# Patient Record
Sex: MALE | Race: WHITE | URBAN - METROPOLITAN AREA
[De-identification: names, ages, dates, MRNs, and addresses within clinical notes are randomized per-mention and may not be internally consistent; named-entity substitution may affect disease eponyms.]

---

## 2021-12-06 ENCOUNTER — OFFICE VISIT (OUTPATIENT)
Dept: URBAN - METROPOLITAN AREA CLINIC 12 | Facility: CLINIC | Age: 66
End: 2021-12-06
Payer: MEDICARE

## 2021-12-06 DIAGNOSIS — E66.3 OVERWEIGHT (BMI 25.0-29.9): ICD-10-CM

## 2021-12-06 DIAGNOSIS — Z79.01 ANTICOAGULATED BY ANTICOAGULATION TREATMENT: ICD-10-CM

## 2021-12-06 DIAGNOSIS — K64.8 INTERNAL HEMORRHOIDS: ICD-10-CM

## 2021-12-06 DIAGNOSIS — Z86.718 HISTORY OF DVT (DEEP VEIN THROMBOSIS): ICD-10-CM

## 2021-12-06 DIAGNOSIS — Z86.010 HISTORY OF COLON POLYPS: ICD-10-CM

## 2021-12-06 DIAGNOSIS — K57.30 DIVERTICULA OF COLON: ICD-10-CM

## 2021-12-06 DIAGNOSIS — K64.4 EXTERNAL HEMORRHOIDS: ICD-10-CM

## 2021-12-06 PROBLEM — 161508001: Status: ACTIVE | Noted: 2021-12-06

## 2021-12-06 PROBLEM — 90458007: Status: ACTIVE | Noted: 2021-12-06

## 2021-12-06 PROBLEM — 733657002: Status: ACTIVE | Noted: 2021-12-06

## 2021-12-06 PROBLEM — 309298003: Status: ACTIVE | Noted: 2021-12-06

## 2021-12-06 PROBLEM — 428283002: Status: ACTIVE | Noted: 2021-12-06

## 2021-12-06 PROBLEM — 23913003: Status: ACTIVE | Noted: 2021-12-06

## 2021-12-06 PROCEDURE — 99202 OFFICE O/P NEW SF 15 MIN: CPT | Performed by: INTERNAL MEDICINE

## 2021-12-06 RX ORDER — APIXABAN 2.5 MG/1
TABLET, FILM COATED ORAL
Qty: 0 | Refills: 0 | Status: ACTIVE | COMMUNITY
Start: 1900-01-01

## 2021-12-06 NOTE — HPI-TODAY'S VISIT:
This is a 66-year-old gentleman presents for GI evaluation in the clinic. He sees Dr. Nithin Harvey for primary care. Patient states that he has had colon polyps in the past. Patient states that he has normal daily bowel habits. There has been no constipation or diarrhea. Has been no bowel pain. Its been no unintentional weight loss. There have been no anemia. There's been no blood noted in his stool. He is feeling well except for arthritis. 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 He has been doing well otherwise. His last colonoscopy was negative and 2018. He was known to have diverticulosis and hemorrhoids but otherwise was okay. There has been no rectal pain. Its been no chest pain or shortness of breath or fatigue.

## 2023-07-31 ENCOUNTER — WEB ENCOUNTER (OUTPATIENT)
Dept: URBAN - METROPOLITAN AREA CLINIC 12 | Facility: CLINIC | Age: 68
End: 2023-07-31

## 2023-08-07 ENCOUNTER — OFFICE VISIT (OUTPATIENT)
Dept: URBAN - METROPOLITAN AREA CLINIC 12 | Facility: CLINIC | Age: 68
End: 2023-08-07
Payer: MEDICARE

## 2023-08-07 ENCOUNTER — DASHBOARD ENCOUNTERS (OUTPATIENT)
Age: 68
End: 2023-08-07

## 2023-08-07 VITALS
TEMPERATURE: 96.8 F | HEART RATE: 61 BPM | WEIGHT: 193 LBS | DIASTOLIC BLOOD PRESSURE: 80 MMHG | HEIGHT: 72 IN | BODY MASS INDEX: 26.14 KG/M2 | SYSTOLIC BLOOD PRESSURE: 132 MMHG

## 2023-08-07 DIAGNOSIS — Z86.010 HISTORY OF COLON POLYPS: ICD-10-CM

## 2023-08-07 DIAGNOSIS — K64.4 EXTERNAL HEMORRHOIDS: ICD-10-CM

## 2023-08-07 DIAGNOSIS — K64.8 INTERNAL HEMORRHOIDS: ICD-10-CM

## 2023-08-07 DIAGNOSIS — K57.30 DIVERTICULA OF COLON: ICD-10-CM

## 2023-08-07 PROBLEM — 162863004: Status: ACTIVE | Noted: 2023-08-07

## 2023-08-07 PROCEDURE — 99213 OFFICE O/P EST LOW 20 MIN: CPT | Performed by: INTERNAL MEDICINE

## 2023-08-07 RX ORDER — APIXABAN 2.5 MG/1
TABLET, FILM COATED ORAL
Qty: 0 | Refills: 0 | Status: ACTIVE | COMMUNITY
Start: 1900-01-01

## 2023-08-07 RX ORDER — POLYETHYLENE GLYCOL 3350, SODIUM SULFATE, SODIUM CHLORIDE, POTASSIUM CHLORIDE, ASCORBIC ACID, SODIUM ASCORBATE 140-9-5.2G
AS DIRECTED KIT ORAL
Qty: 1 BOX | Refills: 0 | OUTPATIENT
Start: 2023-08-07 | End: 2023-08-09

## 2023-08-07 NOTE — HPI-TODAY'S VISIT:
68M presents for evaluation for colonoscopy.  He would like to have a colonoscopy. He is feeling well. No change in BMs. No abd pain. No GI bleeding. No recent problems with hemorrhoids. No chest pain or shortness of breath or palpiations. Eats healthy. Swims and rides bike regularly with neighbor. Trying to lose a few pounds. Seems PCP regularly for RHCM.  On Eliquis for DVT after THR on right in 2010 with DOAC prescribed and followed by heme.   Colonoscopy in 2018 with EH/IH, left tics, and negative for significatn polyps.  Had colonoscopy in past with polyps apparently prior to that.

## 2023-09-15 ENCOUNTER — TELEPHONE ENCOUNTER (OUTPATIENT)
Dept: URBAN - METROPOLITAN AREA CLINIC 6 | Facility: CLINIC | Age: 68
End: 2023-09-15

## 2023-09-18 ENCOUNTER — TELEPHONE ENCOUNTER (OUTPATIENT)
Dept: URBAN - METROPOLITAN AREA CLINIC 12 | Facility: CLINIC | Age: 68
End: 2023-09-18

## 2023-09-20 ENCOUNTER — WEB ENCOUNTER (OUTPATIENT)
Dept: URBAN - METROPOLITAN AREA CLINIC 111 | Facility: CLINIC | Age: 68
End: 2023-09-20

## 2023-10-30 ENCOUNTER — OFFICE VISIT (OUTPATIENT)
Dept: URBAN - METROPOLITAN AREA LAB 3 | Facility: LAB | Age: 68
End: 2023-10-30

## 2023-11-20 ENCOUNTER — OFFICE VISIT (OUTPATIENT)
Dept: URBAN - METROPOLITAN AREA LAB 3 | Facility: LAB | Age: 68
End: 2023-11-20
Payer: MEDICARE

## 2023-11-20 DIAGNOSIS — Z86.010 ADENOMAS PERSONAL HISTORY OF COLONIC POLYPS: ICD-10-CM

## 2023-11-20 DIAGNOSIS — Z09 CARDIOLOGY FOLLOW-UP ENCOUNTER: ICD-10-CM

## 2023-11-20 PROCEDURE — G0105 COLORECTAL SCRN; HI RISK IND: HCPCS | Performed by: INTERNAL MEDICINE

## 2024-02-28 ENCOUNTER — APPOINTMENT (RX ONLY)
Dept: URBAN - METROPOLITAN AREA CLINIC 28 | Facility: CLINIC | Age: 69
Setting detail: DERMATOLOGY
End: 2024-02-28

## 2024-02-28 DIAGNOSIS — Z85.828 PERSONAL HISTORY OF OTHER MALIGNANT NEOPLASM OF SKIN: ICD-10-CM

## 2024-02-28 DIAGNOSIS — L20.89 OTHER ATOPIC DERMATITIS: ICD-10-CM | Status: INADEQUATELY CONTROLLED

## 2024-02-28 DIAGNOSIS — Z71.89 OTHER SPECIFIED COUNSELING: ICD-10-CM

## 2024-02-28 DIAGNOSIS — I87.2 VENOUS INSUFFICIENCY (CHRONIC) (PERIPHERAL): ICD-10-CM

## 2024-02-28 DIAGNOSIS — L57.0 ACTINIC KERATOSIS: ICD-10-CM

## 2024-02-28 PROCEDURE — ? COUNSELING

## 2024-02-28 PROCEDURE — 99214 OFFICE O/P EST MOD 30 MIN: CPT | Mod: 25

## 2024-02-28 PROCEDURE — ? LIQUID NITROGEN

## 2024-02-28 PROCEDURE — ? ADDITIONAL NOTES

## 2024-02-28 PROCEDURE — ? PRESCRIPTION MEDICATION MANAGEMENT

## 2024-02-28 PROCEDURE — ? PRESCRIPTION

## 2024-02-28 PROCEDURE — 17000 DESTRUCT PREMALG LESION: CPT

## 2024-02-28 RX ORDER — CLOBETASOL PROPIONATE 0.5 MG/G
OINTMENT TOPICAL
Qty: 60 | Refills: 1 | Status: ERX | COMMUNITY
Start: 2024-02-28

## 2024-02-28 RX ADMIN — CLOBETASOL PROPIONATE: 0.5 OINTMENT TOPICAL at 00:00

## 2024-02-28 ASSESSMENT — LOCATION ZONE DERM
LOCATION ZONE: ARM
LOCATION ZONE: LEG

## 2024-02-28 ASSESSMENT — LOCATION DETAILED DESCRIPTION DERM
LOCATION DETAILED: RIGHT MEDIAL DISTAL PRETIBIAL REGION
LOCATION DETAILED: LEFT POSTERIOR SHOULDER

## 2024-02-28 ASSESSMENT — LOCATION SIMPLE DESCRIPTION DERM
LOCATION SIMPLE: LEFT SHOULDER
LOCATION SIMPLE: RIGHT PRETIBIAL REGION

## 2024-02-28 NOTE — PROCEDURE: ADDITIONAL NOTES
Additional Notes: Size: 5 X 7 mm\\nDiscussed options include observations vs biopsy vs cryo treatment. Patient elects cryotherapy today. Discussed to keep a watch on it and to return to the clinic if it persists. Reviewed will do biopsy if persists to rule out HAK vs early SCC/ BCC
Detail Level: Simple
Render Risk Assessment In Note?: no

## 2024-02-28 NOTE — PROCEDURE: COUNSELING
Detail Level: Detailed
Patient Specific Counseling (Will Not Stick From Patient To Patient): -\\n\\nPatient is using prescription clobetasol for flare ups on his right ankle. Discussed to use good emollients like ceravae and cetaphil cream. He is requiring a refill of the clobetasol prescription.
Prescription Strength Graduated Compression Stockings Recommendations: The patient was counseled that prescription strength graduated compression stockings should be worn for all waking hours. They will follow up with a venous specialist to monitor graduated compression stocking usage and their symptoms.
Detail Level: Generalized

## 2024-02-28 NOTE — HPI: OTHER
Condition:: Eczema
Please Describe Your Condition:: is an established patient who is being seen for a chief complaint of Eczema. Patient shares that he does have some eczema patches on his right ankle and left arm. He is using the clobetasol ointment on these areas as needed to these areas. He shares that he has some circulation ptroblems from his right replacement on his right leg and also had a blood clot some years ago. He is not sure if that may be related to why his eczema is worse on his right leg. He has seen a vascular specialist in the past for the prominent leg veins but has not had treatment done on it. He does wear compression stockings. He is needing refills of the clobetasol ointment. He avoids using it and tries just emollients, like Cerave cream.
Condition:: Concerning spot
Please Describe Your Condition:: is an established patient who is being seen for a chief complaint of Concerning spot. Patient reports an itchy spot on his left posterior shoulder that he noticed about a month ago that he wants to make sure is okay. It seems to be different than his other tan crusty spots.

## 2024-02-28 NOTE — PROCEDURE: LIQUID NITROGEN
Detail Level: Detailed
Render Post-Care Instructions In Note?: yes
Duration Of Freeze Thaw-Cycle (Seconds): 0
Post-Care Instructions: I reviewed with the patient in detail post-care instructions. Patient is to wear sunprotection, and avoid picking at any of the treated lesions. Pt may apply Vaseline to crusted or scabbing areas.
Consent: The patient's consent was obtained including but not limited to risks of crusting, scabbing, blistering, scarring, darker or lighter pigmentary change, recurrence, incomplete removal and infection.
Render Note In Bullet Format When Appropriate: No

## 2025-06-18 ENCOUNTER — APPOINTMENT (OUTPATIENT)
Dept: URBAN - METROPOLITAN AREA CLINIC 28 | Facility: CLINIC | Age: 70
Setting detail: DERMATOLOGY
End: 2025-06-18

## 2025-06-18 DIAGNOSIS — Z87.2 PERSONAL HISTORY OF DISEASES OF THE SKIN AND SUBCUTANEOUS TISSUE: ICD-10-CM

## 2025-06-18 DIAGNOSIS — L72.8 OTHER FOLLICULAR CYSTS OF THE SKIN AND SUBCUTANEOUS TISSUE: ICD-10-CM | Status: STABLE

## 2025-06-18 DIAGNOSIS — L90.5 SCAR CONDITIONS AND FIBROSIS OF SKIN: ICD-10-CM

## 2025-06-18 DIAGNOSIS — L20.89 OTHER ATOPIC DERMATITIS: ICD-10-CM | Status: WELL CONTROLLED

## 2025-06-18 DIAGNOSIS — Z12.83 ENCOUNTER FOR SCREENING FOR MALIGNANT NEOPLASM OF SKIN: ICD-10-CM

## 2025-06-18 DIAGNOSIS — L82.1 OTHER SEBORRHEIC KERATOSIS: ICD-10-CM

## 2025-06-18 DIAGNOSIS — Z85.828 PERSONAL HISTORY OF OTHER MALIGNANT NEOPLASM OF SKIN: ICD-10-CM

## 2025-06-18 PROCEDURE — ? PRESCRIPTION MEDICATION MANAGEMENT

## 2025-06-18 PROCEDURE — ? OBSERVATION

## 2025-06-18 PROCEDURE — ? COUNSELING

## 2025-06-18 ASSESSMENT — LOCATION DETAILED DESCRIPTION DERM
LOCATION DETAILED: RIGHT PROXIMAL PRETIBIAL REGION
LOCATION DETAILED: RIGHT LATERAL TEMPLE
LOCATION DETAILED: SUPERIOR THORACIC SPINE
LOCATION DETAILED: RIGHT DISTAL PRETIBIAL REGION

## 2025-06-18 ASSESSMENT — LOCATION SIMPLE DESCRIPTION DERM
LOCATION SIMPLE: RIGHT TEMPLE
LOCATION SIMPLE: UPPER BACK
LOCATION SIMPLE: RIGHT PRETIBIAL REGION

## 2025-06-18 ASSESSMENT — LOCATION ZONE DERM
LOCATION ZONE: FACE
LOCATION ZONE: LEG
LOCATION ZONE: TRUNK

## 2025-06-18 NOTE — PROCEDURE: PRESCRIPTION MEDICATION MANAGEMENT
Continue Regimen: Clobetasol ointment apply to the affected areas twice a day as needed with flares
Detail Level: Zone
Plan: Patient declines refills at this time
Render In Strict Bullet Format?: No

## 2025-06-18 NOTE — HPI: OTHER
Condition:: Lesion
Please Describe Your Condition:: is an established patient who is being seen for a chief complaint of Lesion, a recent smooth lump/bump. Patient reports that he had mohs on his right mid anterolateral rueda with Dr. Alarcon. Initially he thought this was at the same spot as his Mohs, but today he sees a white line surgical scar more posterior/lateral to this bump.
Condition:: Full body exam offer and patient accepted.
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
Condition:: Old scalp cyst
Please Describe Your Condition:: The patient has an old cyst of his scalp for years, no changes or symptoms.
Condition:: SK
Please Describe Your Condition:: The patient has an old rough bump in the right upper sideburn for years, no changes.

## 2025-06-18 NOTE — PROCEDURE: COUNSELING
Patient Specific Counseling (Will Not Stick From Patient To Patient): -\\nA.) Right shin \\n\\nReviewed that it is possible for this to be an unusual squamous cell carcinoma but we do not think it is that. \\nReviewed that we do not think this is the squamous cell carcinoma mohs site, there is a linear scar more lateral to this spot. \\n\\nIt looks and feels like a cyst \\n\\nRecommended to call Dr. Cameron office and have an appointment with her to see if she agrees with a cyst, if she does agree then she can do an excision to remove it. Discussed this vs deep shave biopsy here today and risks/benefits of each. \\nPatient agrees with this plan-to see Dr. Alarcon soon,  and he will reach out to her office, her card was given to the patient.\\n\\nB.) Scalp \\n\\nHe has a different type of cyst on the scalp as well. Old and stable and no symptoms. RTC if any changes
Detail Level: Detailed
Patient Specific Counseling (Will Not Stick From Patient To Patient): -\\n\\nSquamous cell carcinoma on the right mid anterolateral shin biopsied here on 10/2022 - Mohs done with Dr. Alarcon 10/2022\\n\\nReviewed that the linear scar looks more like the mohs site, but Dr. Alarcon should have pictures from the procedure and she should be able to tell for sure.
Detail Level: Generalized
Skin Checks Recommendations: SSE q month\\nFBSE with derm q year
Patient Specific Counseling (Will Not Stick From Patient To Patient): -\\n\\nPatient rarely needs the clobetasol ointment; and he has plenty at home.

## 2025-06-18 NOTE — PROCEDURE: OBSERVATION
Patient called stating he had a procedure with Dr Richmond on Friday and wants to talk to a nurse immediately. Said that he was expecting Dr Richmond to call his primary care doctor's office today and something about being made to look like a liar and he was very upset on the phone. Patient was talking rather fast so it was hard for me to comprehend.  
Returned patient call- patient was very upset and stated that because of our office he has been called a liar twice because he told his PCP office that Dr. Richmond needed to speak to his PCP immediately- this was unknown to writer- patient did call the triage center this morning in regards to pain and swelling after surgery and was given instructions by the on call vascular surgeon - this was relayed to patients PCP office this morning- writer apologized about the miscommunication and offered for Dr. Richmond to call PCP to discuss BP medications- patient agreeable after writer allowed him to express how unhappy he was.  Message was immediately given to Dr. Richmond who called PCP immediately and discussed BP medication and that PCP will continue to manage BP meds- writer apologized immensely to patient and took full responsibility of miscommunication-  
Detail Level: Detailed
Size Of Lesion: 6mm